# Patient Record
Sex: FEMALE | Race: BLACK OR AFRICAN AMERICAN | NOT HISPANIC OR LATINO | URBAN - METROPOLITAN AREA
[De-identification: names, ages, dates, MRNs, and addresses within clinical notes are randomized per-mention and may not be internally consistent; named-entity substitution may affect disease eponyms.]

---

## 2017-08-23 ENCOUNTER — NEW REFERRAL (OUTPATIENT)
Dept: URBAN - METROPOLITAN AREA CLINIC 51 | Facility: CLINIC | Age: 47
End: 2017-08-23

## 2017-08-23 DIAGNOSIS — H35.053: ICD-10-CM

## 2017-08-23 DIAGNOSIS — D57.3: ICD-10-CM

## 2017-08-23 DIAGNOSIS — H25.011: ICD-10-CM

## 2017-08-23 DIAGNOSIS — D31.31: ICD-10-CM

## 2017-08-23 PROCEDURE — G8427 DOCREV CUR MEDS BY ELIG CLIN: HCPCS

## 2017-08-23 PROCEDURE — 92134 CPTRZ OPH DX IMG PST SGM RTA: CPT

## 2017-08-23 PROCEDURE — 92235 FLUORESCEIN ANGRPH MLTIFRAME: CPT

## 2017-08-23 PROCEDURE — 1036F TOBACCO NON-USER: CPT

## 2017-08-23 PROCEDURE — 4040F PNEUMOC VAC/ADMIN/RCVD: CPT | Mod: 8P

## 2017-08-23 PROCEDURE — 92250 FUNDUS PHOTOGRAPHY W/I&R: CPT

## 2017-08-23 PROCEDURE — 92225 OPHTHALMOSCOPY (INITIAL): CPT

## 2017-08-23 PROCEDURE — 99244 OFF/OP CNSLTJ NEW/EST MOD 40: CPT

## 2017-08-23 ASSESSMENT — VISUAL ACUITY
OD_SC: 20/30
OD_PH: 20/25
OS_SC: 20/60
OS_PH: 20/30-5

## 2017-08-23 ASSESSMENT — TONOMETRY
OS_IOP_MMHG: 19
OD_IOP_MMHG: 15

## 2017-09-08 ENCOUNTER — FOLLOW UP (OUTPATIENT)
Dept: URBAN - METROPOLITAN AREA CLINIC 51 | Facility: CLINIC | Age: 47
End: 2017-09-08

## 2017-09-08 VITALS — SYSTOLIC BLOOD PRESSURE: 105 MMHG | DIASTOLIC BLOOD PRESSURE: 17 MMHG | HEIGHT: 55 IN

## 2017-09-08 DIAGNOSIS — H35.053: ICD-10-CM

## 2017-09-08 PROCEDURE — 67228 TREATMENT X10SV RETINOPATHY: CPT

## 2017-09-08 PROCEDURE — 67500 INJECT/TREAT EYE SOCKET: CPT

## 2017-09-08 ASSESSMENT — VISUAL ACUITY
OD_PH: 20/20
OD_SC: 20/25
OS_PH: 20/30+3
OS_SC: 20/40

## 2017-09-08 ASSESSMENT — TONOMETRY
OS_IOP_MMHG: 13
OD_IOP_MMHG: 10

## 2017-09-29 ENCOUNTER — PROCEDURE ONLY (OUTPATIENT)
Dept: URBAN - METROPOLITAN AREA CLINIC 51 | Facility: CLINIC | Age: 47
End: 2017-09-29

## 2017-09-29 VITALS — HEIGHT: 55 IN | DIASTOLIC BLOOD PRESSURE: 60 MMHG | SYSTOLIC BLOOD PRESSURE: 114 MMHG

## 2017-09-29 DIAGNOSIS — H35.053: ICD-10-CM

## 2017-09-29 PROCEDURE — 67228 TREATMENT X10SV RETINOPATHY: CPT

## 2017-09-29 PROCEDURE — 67500 INJECT/TREAT EYE SOCKET: CPT

## 2017-09-29 ASSESSMENT — VISUAL ACUITY
OD_PH: 20/20-2
OD_SC: 20/30-2
OS_SC: 20/30-2
OS_PH: 20/25

## 2017-09-29 ASSESSMENT — TONOMETRY
OD_IOP_MMHG: 11
OS_IOP_MMHG: 15

## 2019-01-13 ENCOUNTER — HOSPITAL ENCOUNTER (EMERGENCY)
Dept: HOSPITAL 42 - ED | Age: 49
Discharge: HOME | End: 2019-01-13
Payer: COMMERCIAL

## 2019-01-13 VITALS
HEART RATE: 86 BPM | TEMPERATURE: 98 F | DIASTOLIC BLOOD PRESSURE: 70 MMHG | OXYGEN SATURATION: 99 % | SYSTOLIC BLOOD PRESSURE: 138 MMHG

## 2019-01-13 VITALS — RESPIRATION RATE: 18 BRPM

## 2019-01-13 VITALS — BODY MASS INDEX: 25.2 KG/M2

## 2019-01-13 DIAGNOSIS — S79.911A: Primary | ICD-10-CM

## 2019-01-13 DIAGNOSIS — S89.91XA: ICD-10-CM
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
## 2019-11-06 ENCOUNTER — FOLLOW UP (OUTPATIENT)
Dept: URBAN - METROPOLITAN AREA CLINIC 51 | Facility: CLINIC | Age: 49
End: 2019-11-06

## 2019-11-06 DIAGNOSIS — D57.3: ICD-10-CM

## 2019-11-06 DIAGNOSIS — D31.31: ICD-10-CM

## 2019-11-06 DIAGNOSIS — H35.053: ICD-10-CM

## 2019-11-06 PROCEDURE — 92235 FLUORESCEIN ANGRPH MLTIFRAME: CPT

## 2019-11-06 PROCEDURE — 92226 OPHTHALMOSCOPY (SUB): CPT

## 2019-11-06 PROCEDURE — 92250 FUNDUS PHOTOGRAPHY W/I&R: CPT

## 2019-11-06 PROCEDURE — 92134 CPTRZ OPH DX IMG PST SGM RTA: CPT

## 2019-11-06 PROCEDURE — 92014 COMPRE OPH EXAM EST PT 1/>: CPT

## 2019-11-06 ASSESSMENT — VISUAL ACUITY
OD_CC: 20/20
OS_CC: 20/30+2

## 2019-11-06 ASSESSMENT — TONOMETRY
OS_IOP_MMHG: 15
OD_IOP_MMHG: 15

## 2019-11-18 ENCOUNTER — FOLLOW UP (OUTPATIENT)
Dept: URBAN - METROPOLITAN AREA CLINIC 51 | Facility: CLINIC | Age: 49
End: 2019-11-18

## 2019-11-18 VITALS — SYSTOLIC BLOOD PRESSURE: 112 MMHG | HEIGHT: 55 IN | DIASTOLIC BLOOD PRESSURE: 79 MMHG

## 2019-11-18 DIAGNOSIS — H35.053: ICD-10-CM

## 2019-11-18 PROCEDURE — 67500 INJECT/TREAT EYE SOCKET: CPT

## 2019-11-18 PROCEDURE — 67228 TREATMENT X10SV RETINOPATHY: CPT

## 2019-11-18 ASSESSMENT — VISUAL ACUITY
OS_CC: 20/40
OS_PH: 20/20-1
OD_CC: 20/20+1

## 2019-11-18 ASSESSMENT — TONOMETRY
OD_IOP_MMHG: 15
OS_IOP_MMHG: 17

## 2019-12-02 ENCOUNTER — PROCEDURE ONLY (OUTPATIENT)
Dept: URBAN - METROPOLITAN AREA CLINIC 14 | Facility: CLINIC | Age: 49
End: 2019-12-02

## 2019-12-02 VITALS — SYSTOLIC BLOOD PRESSURE: 116 MMHG | DIASTOLIC BLOOD PRESSURE: 76 MMHG | HEIGHT: 55 IN

## 2019-12-02 DIAGNOSIS — D31.31: ICD-10-CM

## 2019-12-02 DIAGNOSIS — D57.3: ICD-10-CM

## 2019-12-02 DIAGNOSIS — H35.053: ICD-10-CM

## 2019-12-02 PROCEDURE — 92134 CPTRZ OPH DX IMG PST SGM RTA: CPT

## 2019-12-02 PROCEDURE — 67228 TREATMENT X10SV RETINOPATHY: CPT

## 2019-12-02 ASSESSMENT — VISUAL ACUITY
OS_CC: 20/40-1
OS_PH: 20/20
OD_CC: 20/30-2

## 2019-12-02 ASSESSMENT — TONOMETRY
OD_IOP_MMHG: 13
OS_IOP_MMHG: 16

## 2020-01-31 ENCOUNTER — FOLLOW UP (OUTPATIENT)
Dept: URBAN - METROPOLITAN AREA CLINIC 51 | Facility: CLINIC | Age: 50
End: 2020-01-31

## 2020-01-31 DIAGNOSIS — H35.053: ICD-10-CM

## 2020-01-31 DIAGNOSIS — H43.393: ICD-10-CM

## 2020-01-31 DIAGNOSIS — D57.3: ICD-10-CM

## 2020-01-31 PROCEDURE — 92134 CPTRZ OPH DX IMG PST SGM RTA: CPT

## 2020-01-31 PROCEDURE — 92201 OPSCPY EXTND RTA DRAW UNI/BI: CPT

## 2020-01-31 PROCEDURE — 92014 COMPRE OPH EXAM EST PT 1/>: CPT

## 2020-01-31 ASSESSMENT — VISUAL ACUITY
OD_SC: 20/25-1
OD_PH: 20/20
OS_SC: 20/50+2
OS_PH: 20/25

## 2020-01-31 ASSESSMENT — TONOMETRY
OS_IOP_MMHG: 14
OD_IOP_MMHG: 16

## 2020-08-03 ENCOUNTER — FOLLOW UP (OUTPATIENT)
Dept: URBAN - METROPOLITAN AREA CLINIC 51 | Facility: CLINIC | Age: 50
End: 2020-08-03

## 2020-08-03 VITALS — HEIGHT: 55 IN

## 2020-08-03 DIAGNOSIS — D57.3: ICD-10-CM

## 2020-08-03 DIAGNOSIS — D31.31: ICD-10-CM

## 2020-08-03 DIAGNOSIS — H43.393: ICD-10-CM

## 2020-08-03 DIAGNOSIS — H35.053: ICD-10-CM

## 2020-08-03 PROCEDURE — 92201 OPSCPY EXTND RTA DRAW UNI/BI: CPT

## 2020-08-03 PROCEDURE — 92014 COMPRE OPH EXAM EST PT 1/>: CPT

## 2020-08-03 PROCEDURE — 92134 CPTRZ OPH DX IMG PST SGM RTA: CPT

## 2020-08-03 ASSESSMENT — TONOMETRY
OD_IOP_MMHG: 10
OS_IOP_MMHG: 12

## 2020-08-03 ASSESSMENT — VISUAL ACUITY
OS_SC: 20/30-1
OD_SC: 20/25-2
OS_PH: 20/30